# Patient Record
Sex: FEMALE | Race: WHITE | NOT HISPANIC OR LATINO | Employment: UNEMPLOYED | URBAN - METROPOLITAN AREA
[De-identification: names, ages, dates, MRNs, and addresses within clinical notes are randomized per-mention and may not be internally consistent; named-entity substitution may affect disease eponyms.]

---

## 2025-06-16 ENCOUNTER — APPOINTMENT (OUTPATIENT)
Dept: URGENT CARE | Age: 25
End: 2025-06-16

## 2025-06-17 ENCOUNTER — HOSPITAL ENCOUNTER (EMERGENCY)
Facility: HOSPITAL | Age: 25
Discharge: HOME | End: 2025-06-17
Payer: MEDICAID

## 2025-06-17 ENCOUNTER — HOSPITAL ENCOUNTER (EMERGENCY)
Facility: HOSPITAL | Age: 25
Discharge: HOME | End: 2025-06-17
Attending: STUDENT IN AN ORGANIZED HEALTH CARE EDUCATION/TRAINING PROGRAM
Payer: MEDICAID

## 2025-06-17 VITALS
SYSTOLIC BLOOD PRESSURE: 169 MMHG | DIASTOLIC BLOOD PRESSURE: 105 MMHG | WEIGHT: 122 LBS | HEIGHT: 64 IN | HEART RATE: 120 BPM | RESPIRATION RATE: 18 BRPM | OXYGEN SATURATION: 97 % | BODY MASS INDEX: 20.83 KG/M2 | TEMPERATURE: 98.8 F

## 2025-06-17 VITALS
WEIGHT: 120 LBS | HEART RATE: 129 BPM | BODY MASS INDEX: 20.49 KG/M2 | RESPIRATION RATE: 18 BRPM | TEMPERATURE: 98.2 F | OXYGEN SATURATION: 98 % | HEIGHT: 64 IN | DIASTOLIC BLOOD PRESSURE: 89 MMHG | SYSTOLIC BLOOD PRESSURE: 146 MMHG

## 2025-06-17 DIAGNOSIS — L02.91 ABSCESS: ICD-10-CM

## 2025-06-17 DIAGNOSIS — H60.501 ACUTE OTITIS EXTERNA OF RIGHT EAR, UNSPECIFIED TYPE: Primary | ICD-10-CM

## 2025-06-17 DIAGNOSIS — R60.9 DEPENDENT EDEMA: ICD-10-CM

## 2025-06-17 LAB — GLUCOSE BLD MANUAL STRIP-MCNC: 421 MG/DL (ref 74–99)

## 2025-06-17 PROCEDURE — 4500999001 HC ED NO CHARGE

## 2025-06-17 PROCEDURE — 99281 EMR DPT VST MAYX REQ PHY/QHP: CPT

## 2025-06-17 PROCEDURE — 82947 ASSAY GLUCOSE BLOOD QUANT: CPT

## 2025-06-17 PROCEDURE — 99283 EMERGENCY DEPT VISIT LOW MDM: CPT

## 2025-06-17 PROCEDURE — 99284 EMERGENCY DEPT VISIT MOD MDM: CPT | Performed by: STUDENT IN AN ORGANIZED HEALTH CARE EDUCATION/TRAINING PROGRAM

## 2025-06-17 RX ORDER — NEOMYCIN SULFATE, POLYMYXIN B SULFATE AND HYDROCORTISONE 10; 3.5; 1 MG/ML; MG/ML; [USP'U]/ML
4 SUSPENSION/ DROPS AURICULAR (OTIC) 4 TIMES DAILY
Qty: 6 ML | Refills: 0 | Status: SHIPPED | OUTPATIENT
Start: 2025-06-17 | End: 2025-06-24

## 2025-06-17 RX ORDER — CYANOCOBALAMIN (VITAMIN B-12) 1000 MCG
1 TABLET, EXTENDED RELEASE ORAL ONCE
Qty: 1 EACH | Refills: 0 | Status: SHIPPED | OUTPATIENT
Start: 2025-06-17 | End: 2025-06-17

## 2025-06-17 RX ORDER — AMOXICILLIN 500 MG/1
1000 CAPSULE ORAL 2 TIMES DAILY
Qty: 56 CAPSULE | Refills: 0 | Status: SHIPPED | OUTPATIENT
Start: 2025-06-17 | End: 2025-07-01

## 2025-06-17 RX ORDER — DOXYCYCLINE 100 MG/1
100 CAPSULE ORAL 2 TIMES DAILY
Qty: 20 CAPSULE | Refills: 0 | Status: SHIPPED | OUTPATIENT
Start: 2025-06-17 | End: 2025-06-27

## 2025-06-17 RX ORDER — INSULIN LISPRO 100 [IU]/ML
INJECTION, SOLUTION INTRAVENOUS; SUBCUTANEOUS
COMMUNITY

## 2025-06-17 RX ORDER — INSULIN GLARGINE 300 U/ML
INJECTION, SOLUTION SUBCUTANEOUS
COMMUNITY
Start: 2025-06-09

## 2025-06-17 ASSESSMENT — PAIN - FUNCTIONAL ASSESSMENT
PAIN_FUNCTIONAL_ASSESSMENT: 0-10
PAIN_FUNCTIONAL_ASSESSMENT: 0-10

## 2025-06-17 ASSESSMENT — PAIN DESCRIPTION - PAIN TYPE: TYPE: ACUTE PAIN

## 2025-06-17 ASSESSMENT — LIFESTYLE VARIABLES
EVER FELT BAD OR GUILTY ABOUT YOUR DRINKING: NO
HAVE PEOPLE ANNOYED YOU BY CRITICIZING YOUR DRINKING: NO
EVER HAD A DRINK FIRST THING IN THE MORNING TO STEADY YOUR NERVES TO GET RID OF A HANGOVER: NO
TOTAL SCORE: 0
HAVE YOU EVER FELT YOU SHOULD CUT DOWN ON YOUR DRINKING: NO

## 2025-06-17 ASSESSMENT — PAIN SCALES - GENERAL
PAINLEVEL_OUTOF10: 6
PAINLEVEL_OUTOF10: 3

## 2025-06-17 ASSESSMENT — PAIN DESCRIPTION - LOCATION: LOCATION: EAR

## 2025-06-17 NOTE — ED PROVIDER NOTES
"Chief Complaint   Patient presents with    Earache     Right ear bothering her. Went to urgent care. NP at urgent care said she needed to come to ER due to bone behind ear being swollen.     scabs on scalp     Is complaining of scabs on scalp that ooze \"goo\" and then hair wont grow back. Also trouble thinking?       HPI       25 year old female presents to the Emergency Department today complaining of a 1 week history of right ear pain with muffled hearing. Notes that she has had a 1 year history of intermittent sores on her scalp. Has been evaluated by a dermatologist for such in the past. Has an area behind her right ear. Denies any associated fever, chills, headache, neck pain, chest pain, shortness of breath, abdominal pain, nausea, vomiting, diarrhea, constipation, or urinary symptoms. Has chronic lower extremity edema that is unchanged.       History provided by:  Patient             Patient History   Medical History[1]  Surgical History[2]  Family History[3]  Social History[4]        Physical Exam  Constitutional:       Appearance: Normal appearance.   HENT:      Head: Normocephalic.      Right Ear: External ear normal.      Left Ear: Tympanic membrane, ear canal and external ear normal.      Ears:      Comments: Edematous right auditory canal without discharge or drainage. Unable to visualize her right TM. Pain with pinna pull and tragus tenderness is noted as well. Right post auricular lymphadenopathy is noted. She had an area of induration in the right post auricular area with a sticky white center. I was able to manually express the material.      Nose: Nose normal.      Mouth/Throat:      Lips: Pink.      Mouth: Mucous membranes are moist.      Dentition: No dental caries.      Pharynx: Oropharynx is clear. Uvula midline. No oropharyngeal exudate or posterior oropharyngeal erythema.      Tonsils: No tonsillar exudate. 1+ on the right. 1+ on the left.   Eyes:      Conjunctiva/sclera: Conjunctivae normal. "      Pupils: Pupils are equal, round, and reactive to light.   Cardiovascular:      Rate and Rhythm: Regular rhythm. Tachycardia present.      Heart sounds: No murmur heard.     No friction rub. No gallop.   Pulmonary:      Effort: Pulmonary effort is normal. No respiratory distress.      Breath sounds: Normal breath sounds. No stridor. No wheezing, rhonchi or rales.   Neurological:      Mental Status: She is alert.         Labs Reviewed - No data to display    No orders to display            ED Course & MDM   Diagnoses as of 06/17/25 1319   Acute otitis externa of right ear, unspecified type   Abscess   Dependent edema           Medical Decision Making  Patient was seen and evaluated by Dr. Milian. Edematous right auditory canal without discharge or drainage. Unable to visualize her right TM. Pain with pinna pull and tragus tenderness is noted as well. Will treat the otitis externa and possible media with Amoxicillin and Cortisporin drops. She had an area of induration in the right post auricular area with a sticky white center. I was able to manually express the material. Will treat the abscess with Doxycycline. Referred to dermatology. Follow up with their doctor in 3 days. Return if worse in any way. Discharged in stable condition with computer instructions. We feel that her elevated heart rate is related to her underlying methamphetamine use rather than sepsis.     Diagnostic Impression:     1. Acute otitis media    2. Abscess    3. Prescription therapy            Your medication list        START taking these medications        Instructions Last Dose Given Next Dose Due   compress.stocking,knee,reg,med misc      1 each once daily.       oral thermometer, electronic misc  Commonly known as: Digital Thermometer      1 each 1 time for 1 dose.              ASK your doctor about these medications        Instructions Last Dose Given Next Dose Due   insulin lispro 100 unit/mL pen  Commonly known as: HumaLOG            Toujeo SoloStar U-300 Insulin 300 unit/mL (1.5 mL) pen  Generic drug: insulin glargine                     Where to Get Your Medications        These medications were sent to [facility][id number] - hospitals [address] AT [facility] [address] & [facility]  [address] [facility] [address]      Phone: [phone]   compress.stocking,knee,reg,med misc  oral thermometer, electronic misc           Procedure  Procedures       ALAN Galeana-ALEJANDRA  06/17/25 5426         [1]   Past Medical History:  Diagnosis Date    Diabetes type 1    [2] History reviewed. No pertinent surgical history.  [3] No family history on file.  [4]   Social History  Tobacco Use    Smoking status: Every Day     Types: Cigarettes    Smokeless tobacco: Never   Vaping Use    Vaping status: Some Days    Substances: Nicotine   Substance Use Topics    Alcohol use: Never    Drug use: Yes     Types: Marijuana, Methamphetamines        BETTINA Galeana  06/17/25 5071

## 2025-06-17 NOTE — ED TRIAGE NOTES
"Pt arrived to ED via private vehicle, pt was sent from  for some wounds that are behind bilat ears, and scalp and multiple other spots . Pt also having some bilat lower extremity swelling with some trace of pitting, denies CP or SOB. Pt is a brittle diabetic type 1. Pt is legally blind and states that she can not get around to doctors appts like she should states that she is living in a hotel with an ex bf and states he doesn't help with transport, pt states that she is also supposed to be on BP meds but doesn't have a PCP and again doesn't have to means to get to one. Pt also have c/o coughing and friend states that its clear and white, pt states it was \"think slime\". Pt also c/o going to the bathroom and states that before she is done she has fallen asleep, states that she's done it 4x before she is able to get up and go to bed.   "